# Patient Record
Sex: FEMALE | Race: WHITE | NOT HISPANIC OR LATINO | Employment: FULL TIME
[De-identification: names, ages, dates, MRNs, and addresses within clinical notes are randomized per-mention and may not be internally consistent; named-entity substitution may affect disease eponyms.]

---

## 2020-12-27 ENCOUNTER — HEALTH MAINTENANCE LETTER (OUTPATIENT)
Age: 45
End: 2020-12-27

## 2021-10-09 ENCOUNTER — HEALTH MAINTENANCE LETTER (OUTPATIENT)
Age: 46
End: 2021-10-09

## 2021-12-29 ENCOUNTER — OFFICE VISIT (OUTPATIENT)
Dept: OPTOMETRY | Facility: CLINIC | Age: 46
End: 2021-12-29
Payer: COMMERCIAL

## 2021-12-29 DIAGNOSIS — H52.13 MYOPIA OF BOTH EYES: ICD-10-CM

## 2021-12-29 DIAGNOSIS — Z01.00 EXAMINATION OF EYES AND VISION: Primary | ICD-10-CM

## 2021-12-29 DIAGNOSIS — H52.223 REGULAR ASTIGMATISM OF BOTH EYES: ICD-10-CM

## 2021-12-29 DIAGNOSIS — H10.13 ALLERGIC CONJUNCTIVITIS OF BOTH EYES: ICD-10-CM

## 2021-12-29 PROCEDURE — 92310 CONTACT LENS FITTING OU: CPT | Mod: GA | Performed by: OPTOMETRIST

## 2021-12-29 PROCEDURE — 92004 COMPRE OPH EXAM NEW PT 1/>: CPT | Performed by: OPTOMETRIST

## 2021-12-29 PROCEDURE — 92015 DETERMINE REFRACTIVE STATE: CPT | Performed by: OPTOMETRIST

## 2021-12-29 RX ORDER — MELOXICAM 15 MG/1
15 TABLET ORAL DAILY
COMMUNITY
End: 2023-01-31

## 2021-12-29 RX ORDER — SCOLOPAMINE TRANSDERMAL SYSTEM 1 MG/1
1 PATCH, EXTENDED RELEASE TRANSDERMAL
COMMUNITY
End: 2023-03-27

## 2021-12-29 RX ORDER — FEXOFENADINE HCL 180 MG/1
180 TABLET ORAL DAILY
COMMUNITY

## 2021-12-29 RX ORDER — OLOPATADINE HYDROCHLORIDE 2 MG/ML
1 SOLUTION/ DROPS OPHTHALMIC DAILY
Qty: 2.5 ML | Refills: 11 | Status: SHIPPED | OUTPATIENT
Start: 2021-12-29 | End: 2022-12-29

## 2021-12-29 ASSESSMENT — REFRACTION_WEARINGRX
OS_SPHERE: -4.00
OD_CYLINDER: +0.75
OS_AXIS: 135
OD_AXIS: 080
OS_CYLINDER: +0.75
OD_SPHERE: -4.75

## 2021-12-29 ASSESSMENT — SLIT LAMP EXAM - LIDS
COMMENTS: NORMAL
COMMENTS: NORMAL

## 2021-12-29 ASSESSMENT — REFRACTION_CURRENTRX
OS_SPHERE: -3.50
OS_DIAMETER: 14.2
OD_BASECURVE: 8.6
OD_DIAMETER: 14.2
OS_BASECURVE: 8.6
OD_SPHERE: -4.00

## 2021-12-29 ASSESSMENT — TONOMETRY
OD_IOP_MMHG: 12
OS_IOP_MMHG: 12
IOP_METHOD: APPLANATION

## 2021-12-29 ASSESSMENT — REFRACTION_MANIFEST
OS_AXIS: 135
OS_CYLINDER: +0.50
OD_SPHERE: -4.50
OD_CYLINDER: +0.50
OS_SPHERE: -4.00
OD_AXIS: 070

## 2021-12-29 ASSESSMENT — CUP TO DISC RATIO
OD_RATIO: 0.25
OS_RATIO: 0.2

## 2021-12-29 ASSESSMENT — VISUAL ACUITY
OD_CC: 20/20
OS_CC: 20/20 -2
CORRECTION_TYPE: CONTACTS
OS_CC: 20/20
OD_CC: 20/20
METHOD: SNELLEN - LINEAR
OS_CC+: -1

## 2021-12-29 ASSESSMENT — EXTERNAL EXAM - LEFT EYE: OS_EXAM: NORMAL

## 2021-12-29 ASSESSMENT — EXTERNAL EXAM - RIGHT EYE: OD_EXAM: NORMAL

## 2021-12-29 ASSESSMENT — CONF VISUAL FIELD
OD_NORMAL: 1
OS_NORMAL: 1

## 2021-12-29 NOTE — PROGRESS NOTES
Chief Complaint   Patient presents with     COMPREHENSIVE EYE EXAM     Contact Lens Fitting        Previous contact lens wearer? Yes: B & L Infuse daily  Comfort of contact lenses : good  Satisfied with current lenses: Yes      Last Eye Exam: 12/1/2020  Dilated Previousl/y: Yes, Cristina is very sensitive to dilation. She does not want to be dilated today. She prefers to have it done in the evening and get a     What are you currently using to see?  glasses and contacts    Distance Vision Acuity: Satisfied with vision    Near Vision Acuity: Not satisfied     Eye Comfort: watery and itchy at times. She has allergies  Do you use eye drops? 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       OBJECTIVE: See Ophthalmology exam    ASSESSMENT:    ICD-10-CM    1. Examination of eyes and vision  Z01.00    2. Myopia of both eyes  H52.13    3. Regular astigmatism of both eyes  H52.223    4. Allergic conjunctivitis of both eyes  H10.13 olopatadine (PATADAY) 0.2 % ophthalmic solution      PLAN:     Patient Instructions   Eyeglass prescription given.    Contact lens prescription given and form signed.    Recommend returning for dilated fundus exam. It is a more thorough exam of the retina.    Return in 1 year for a complete eye exam or sooner if needed.    Dhiraj Mcbride, OD

## 2021-12-29 NOTE — PATIENT INSTRUCTIONS
Eyeglass prescription given.    Contact lens prescription given and form signed. OTC readers as needed. +1.25.    Recommend returning for dilated fundus exam. It is a more thorough exam of the retina.    Return in 1 year for a complete eye exam or sooner if needed.    Dhiraj Mcbride OD      Optometry Providers       Clinic Locations                                 Telephone Number   Dr. Elma Raza    [place]   [facility] [phone]     [facility] Hours:                [place] [facility] Hours:       [place] [facility] Hours:   [address]   [address]     [address] [facility] [place] [address]   [place] [address]    [place] [address]  Phone: [phone]                    Phone: [phone]     Phone: [phone]                      Monday 8:00-6:00                          Monday 8:00-6:00                          Monday 8:00-6:00              Tuesday 8:00-6:00                          Tuesday 8:00-6:00                          Tuesday 8:00-6:00              Wednesday 8:00-6:00                  Wednesday 8:00-6:00                   Wednesday 8:00-6:00      Thursday 8:00-6:00                        Thursday 8:00-6:00                         Thursday 8:00-6:00            Friday 8:00-5:00                              Friday 8:00-5:00                              Friday 8:00-5:00    [facility] Hours:   [address] [place] [address]  [phone]    Monday 9:00-6:00  Tuesday 9:00-6:00  Wednesday 9:00-6:00  Thursday 9:00-6:00  Friday 9:00-5:00  Please log on to [email or web address] to order your contact lenses.  The link is found on the [facility] page.  As always, Thank you for trusting us with your health care needs!

## 2022-01-29 ENCOUNTER — HEALTH MAINTENANCE LETTER (OUTPATIENT)
Age: 47
End: 2022-01-29

## 2022-03-03 ENCOUNTER — LAB REQUISITION (OUTPATIENT)
Dept: LAB | Facility: CLINIC | Age: 47
End: 2022-03-03

## 2022-03-03 PROCEDURE — 86481 TB AG RESPONSE T-CELL SUSP: CPT | Performed by: INTERNAL MEDICINE

## 2022-03-03 PROCEDURE — 86706 HEP B SURFACE ANTIBODY: CPT | Performed by: INTERNAL MEDICINE

## 2022-03-04 LAB
HBV SURFACE AB SERPL IA-ACNC: 850.44 M[IU]/ML
QUANTIFERON MITOGEN: 10 IU/ML
QUANTIFERON NIL TUBE: 0.07 IU/ML
QUANTIFERON TB1 TUBE: 0.07 IU/ML
QUANTIFERON TB2 TUBE: 0.07

## 2022-03-05 LAB
GAMMA INTERFERON BACKGROUND BLD IA-ACNC: 0.07 IU/ML
M TB IFN-G BLD-IMP: NEGATIVE
M TB IFN-G CD4+ BCKGRND COR BLD-ACNC: 9.93 IU/ML
MITOGEN IGNF BCKGRD COR BLD-ACNC: 0 IU/ML
MITOGEN IGNF BCKGRD COR BLD-ACNC: 0 IU/ML

## 2022-09-11 ENCOUNTER — HEALTH MAINTENANCE LETTER (OUTPATIENT)
Age: 47
End: 2022-09-11

## 2023-01-31 DIAGNOSIS — M54.9 BACK PAIN, UNSPECIFIED BACK LOCATION, UNSPECIFIED BACK PAIN LATERALITY, UNSPECIFIED CHRONICITY: Primary | ICD-10-CM

## 2023-01-31 RX ORDER — MELOXICAM 15 MG/1
15 TABLET ORAL DAILY PRN
Qty: 30 TABLET | Refills: 3 | Status: SHIPPED | OUTPATIENT
Start: 2023-01-31 | End: 2024-02-19

## 2023-03-23 ENCOUNTER — OFFICE VISIT (OUTPATIENT)
Dept: OPTOMETRY | Facility: CLINIC | Age: 48
End: 2023-03-23
Payer: COMMERCIAL

## 2023-03-23 DIAGNOSIS — H52.223 REGULAR ASTIGMATISM OF BOTH EYES: ICD-10-CM

## 2023-03-23 DIAGNOSIS — H52.13 MYOPIA OF BOTH EYES: ICD-10-CM

## 2023-03-23 DIAGNOSIS — Z01.00 EXAMINATION OF EYES AND VISION: Primary | ICD-10-CM

## 2023-03-23 DIAGNOSIS — H52.4 PRESBYOPIA: ICD-10-CM

## 2023-03-23 PROCEDURE — 92014 COMPRE OPH EXAM EST PT 1/>: CPT | Performed by: OPTOMETRIST

## 2023-03-23 PROCEDURE — 92310 CONTACT LENS FITTING OU: CPT | Mod: GA | Performed by: OPTOMETRIST

## 2023-03-23 PROCEDURE — 92015 DETERMINE REFRACTIVE STATE: CPT | Performed by: OPTOMETRIST

## 2023-03-23 ASSESSMENT — REFRACTION_WEARINGRX
OS_CYLINDER: +0.50
OS_SPHERE: -4.00
OS_SPHERE: -4.00
OS_AXIS: 135
OD_AXIS: 080
OS_AXIS: 135
OD_AXIS: 080
OS_AXIS: 135
OD_CYLINDER: +0.75
OS_SPHERE: -4.00
OS_CYLINDER: +0.75
OD_SPHERE: -4.50
OS_CYLINDER: +0.75
OD_CYLINDER: +0.75
OD_AXIS: 070
OD_CYLINDER: +0.50
SPECS_TYPE: SVL
OD_SPHERE: -4.75
OD_SPHERE: -4.75

## 2023-03-23 ASSESSMENT — SLIT LAMP EXAM - LIDS
COMMENTS: NORMAL
COMMENTS: NORMAL

## 2023-03-23 ASSESSMENT — TONOMETRY
IOP_METHOD: APPLANATION
OS_IOP_MMHG: 14
OD_IOP_MMHG: 14

## 2023-03-23 ASSESSMENT — REFRACTION_MANIFEST
OS_CYLINDER: +0.50
OD_AXIS: 070
OS_AXIS: 135
OD_SPHERE: -4.50
OS_SPHERE: -4.00
OD_CYLINDER: +0.50

## 2023-03-23 ASSESSMENT — CONF VISUAL FIELD
OS_NORMAL: 1
OS_INFERIOR_NASAL_RESTRICTION: 0
OS_INFERIOR_TEMPORAL_RESTRICTION: 0
OD_NORMAL: 1
OD_SUPERIOR_TEMPORAL_RESTRICTION: 0
OD_INFERIOR_NASAL_RESTRICTION: 0
OD_INFERIOR_TEMPORAL_RESTRICTION: 0
OS_SUPERIOR_NASAL_RESTRICTION: 0
OS_SUPERIOR_TEMPORAL_RESTRICTION: 0
OD_SUPERIOR_NASAL_RESTRICTION: 0

## 2023-03-23 ASSESSMENT — VISUAL ACUITY
OS_CC+: -1
OD_CC+: -1
OS_CC: 20/20
CORRECTION_TYPE: CONTACTS
OD_CC: 20/20
OD_CC: 20/20
OS_CC: 20/20
METHOD: SNELLEN - LINEAR

## 2023-03-23 ASSESSMENT — REFRACTION_CURRENTRX
OS_DIAMETER: 14.2
OD_DIAMETER: 14.2
OD_SPHERE: -4.00
OS_BASECURVE: 8.6
OD_BASECURVE: 8.6
OS_SPHERE: -3.50

## 2023-03-23 ASSESSMENT — EXTERNAL EXAM - RIGHT EYE: OD_EXAM: NORMAL

## 2023-03-23 ASSESSMENT — CUP TO DISC RATIO
OS_RATIO: 0.25
OD_RATIO: 0.25

## 2023-03-23 ASSESSMENT — EXTERNAL EXAM - LEFT EYE: OS_EXAM: NORMAL

## 2023-03-23 NOTE — LETTER
3/23/2023         RE: Cristina Sosa  [address]  [facility] [address]        Dear Colleague,    Thank you for referring your patient, Cristina Sosa, to the [facility]. Please see a copy of my visit note below.    Chief Complaint   Patient presents with     Annual Eye Exam     More contacts fit fee $75 ok        Previous contact lens wearer? Yes: b&l infuse   Comfort of contact lenses :good  Satisfied with current lenses: Yes    Last Eye Exam: 12-  Dilated Previously: Yes, last dilation in 2020 but does not want dilatin today     What are you currently using to see?  glasses and contacts    Distance Vision Acuity: Noticed gradual change in both eyes    Near Vision Acuity: Not satisfied ,has to use cheaters over contacts - +1.25/+1.50    Eye Comfort: good- sometimes itchy eyes with allergies- Oral allergy med takes care of it (Allegra)  Do you use eye drops? 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  Probably would not adjust to monovision.    Jazmin Coats Optometric Assistant, A.B.O.C.     Medical, surgical and family histories reviewed and updated 3/23/2023.       OBJECTIVE: See Ophthalmology exam    ASSESSMENT:    ICD-10-CM    1. Examination of eyes and vision  Z01.00       2. Myopia of both eyes  H52.13       3. Regular astigmatism of both eyes  H52.223          PLAN:     Patient Instructions   Eyeglass prescription given.    Recommend returning for dilated fundus exam. It is a more thorough exam of the retina.    Recommend annual eye exams.    Dhiraj Mcbride, OD                           Again, thank you for allowing me to participate in the care of your patient.        Sincerely,        Dhiraj Mcbride, OD

## 2023-03-23 NOTE — PROGRESS NOTES
Chief Complaint   Patient presents with     Annual Eye Exam     More contacts fit fee $75 ok        Previous contact lens wearer? Yes: b&l infuse   Comfort of contact lenses :good  Satisfied with current lenses: Yes    Last Eye Exam: 12-  Dilated Previously: Yes, last dilation in 2020 but does not want dilatin today     What are you currently using to see?  glasses and contacts    Distance Vision Acuity: Noticed gradual change in both eyes    Near Vision Acuity: Not satisfied ,has to use cheaters over contacts - +1.25/+1.50    Eye Comfort: good- sometimes itchy eyes with allergies- Oral allergy med takes care of it (Allegra)  Do you use eye drops? 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  Probably would not adjust to monovision.    Jazmin Coats Optometric Assistant, A.B.O.C.     Medical, surgical and family histories reviewed and updated 3/23/2023.       OBJECTIVE: See Ophthalmology exam    ASSESSMENT:    ICD-10-CM    1. Examination of eyes and vision  Z01.00       2. Myopia of both eyes  H52.13       3. Regular astigmatism of both eyes  H52.223          PLAN:     Patient Instructions   Eyeglass prescription given.    Recommend returning for dilated fundus exam. It is a more thorough exam of the retina.    Recommend annual eye exams.    Dhiraj Mcbride, OD

## 2023-03-23 NOTE — PATIENT INSTRUCTIONS
Eyeglass prescription given.    Recommend returning for dilated fundus exam. It is a more thorough exam of the retina.    Recommend annual eye exams.    Dhiraj Mcbride OD      Optometry Providers       Clinic Locations                                 Telephone Number   Dr. Elma Flores   [place] [phone]     [place] [facility] Hours:                [place] [facility] Hours:       [place] [facility] Hours:   [address]   [address] [facility] [address]     [address] [place] [address]   [place] [address]    [place] [address]  Phone: [phone]                    Phone: [phone]     Phone: [phone]                      Monday 8:00-6:00                          Monday 8:00-6:00                          Monday 8:00-6:00              Tuesday 8:00-6:00                          Tuesday 8:00-6:00                          Tuesday 8:00-6:00              Wednesday 8:00-6:00                  Wednesday 8:00-6:00                   Wednesday 8:00-6:00      Thursday 8:00-6:00                        Thursday 8:00-6:00                         Thursday 8:00-6:00            Friday 8:00-5:00                              Friday 8:00-5:00                              Friday 8:00-5:00    [facility] Hours:   [address] [place] [address]  [phone]    Monday 9:00-6:00  Tuesday 9:00-6:00  Wednesday 9:00-6:00  Thursday 9:00-6:00  Friday 9:00-5:00  Please log on to [email or web address] to order your contact lenses.  The link is found on the [facility] Care [facility] Vision Services page.  As always, Thank you for trusting us with your health care needs!    There is a combination of three treatments which can greatly improve symptoms of dry eyes.     Artificial tears  Heat (eyes closed)  Eyelid and eyelash cleansing (eyes closed)     Use one drop of artificial tears both eyes 4 x daily.  Once in  the morning, lunch, dinner and bedtime. Continue to use the drops regardless if your eyes are comfortable or not.  Artificial tears work best as a preventative and not as well after your eyes are starting to bother you.  It may take 4- 6 weeks of using the drops before you notice improvement.  If after that time you are still having problems schedule an appointment for an evaluation and discussion of different treatments such as Restasis or Xiidra.  Dry eyes are a chronic condition and you may have more symptoms at certain times of the year.    Excess tearing can be due to the right tears not working properly or a blockage in the tear drainage system.  You can try using artificial tears 1 drop both eyes 4 x day.  If the excess tearing is bothersome after 4-6 weeks of treatment then we can send you for further testing.  This would entail a referral to our oculoplastic specialist [phone].    Recommended brands are:    Systane Complete  Systane Ultra  Systane Balance  Refresh Advanced Optive  Refresh Relieva  Blink    Recommended brands for contact lens wearers are:    Systane contacts  Refresh contacts  Blink contacts    If you are using drops more than 4 x day or have sensitivities to preservatives I recommend non preserved artificial tears.  These come in 1 use vials.  They can be used every 1-2 hours.  Do not reuse the vials.    Recommended brands are:    Refresh Optive Bossman-3  Systane- preservative free  Refresh-  preservative free  Blink- preservative free    Gels or ointment can be used at night.    Recommended brands are:    Systane Gel  Refresh Gel  Blink Gel  Genteal Gel    Systane night time (ointment)  Refresh Celluvisc  Refresh PM (ointment)      Visine, Clear Eyes or Murine (drops that get the red out) can irritate the eyes and cause a rebound effect where the eyes become more red and you end up using more drops.  Avoid drops containing tetrahydrozoline,  naphazoline, phenylephrine, oxymetazoline.      OTC Lumify is a newer product that gives immediate redness relief without the rebound effect.  Use as needed to take the redness out.    Artificial tears may be used with other drops (such as allergy, glaucoma, antibiotics) around the same time.  Be sure to wait 5 minutes in between drops.    Heat to the eyelids can also improve your symptoms of dry eyes.  Aditi heat masks can be purchased at Amazon to be used nightly for 10-15 minutes.  Other options are gel masks that can be put in the microwave and purchased at most pharmacies.      Tea Tree Oil eyelid cleansers recommended are Ocusoft Oust foam cleanser to cleanse eyelids/lashes at night and in the am. Other options are Blephadex or Cliradex eyelid wipes.  KEEP EYES CLOSED when using these products.  These can be purchased on amazon.com   A good product for make up remover with tea tree oil is WeLoveEyes.  This can be found at www..[employer] or [employer].    Other good eyelid cleansers have hypochlorous which removes excess bacteria and is safe around the eyes. Products are Avenova, Ocusoft Hypochlor or Heyedrate. Spray solution onto cotton pad, close eyes and gently apply to eyelids and eyelashes using side to side motion.  You can also KEEP EYES CLOSED spray and rub into eyelashes.  You do not need to rinse it off. Use morning and evening. These products can be found on Amazon.  You can check with your local pharmacy and see if they can order if for you if they don't have it.    Other brands of eyelid cleansing wipes are:    Ocusoft wipes  Systane wipes    A great eye make up line is [email or web address]

## 2023-03-27 DIAGNOSIS — Z87.898 H/O MOTION SICKNESS: Primary | ICD-10-CM

## 2023-03-27 RX ORDER — ONDANSETRON 4 MG/1
4 TABLET, ORALLY DISINTEGRATING ORAL EVERY 8 HOURS PRN
Qty: 10 TABLET | Refills: 3 | Status: SHIPPED | OUTPATIENT
Start: 2023-03-27 | End: 2023-07-03

## 2023-03-27 RX ORDER — SCOLOPAMINE TRANSDERMAL SYSTEM 1 MG/1
1 PATCH, EXTENDED RELEASE TRANSDERMAL
Qty: 4 PATCH | Refills: 1 | Status: SHIPPED | OUTPATIENT
Start: 2023-03-27 | End: 2023-07-03

## 2023-05-06 ENCOUNTER — HEALTH MAINTENANCE LETTER (OUTPATIENT)
Age: 48
End: 2023-05-06

## 2023-07-03 ENCOUNTER — TELEPHONE (OUTPATIENT)
Dept: NEUROLOGY | Facility: CLINIC | Age: 48
End: 2023-07-03
Payer: COMMERCIAL

## 2023-07-03 DIAGNOSIS — Z87.898 H/O MOTION SICKNESS: Primary | ICD-10-CM

## 2023-07-03 RX ORDER — SCOLOPAMINE TRANSDERMAL SYSTEM 1 MG/1
1 PATCH, EXTENDED RELEASE TRANSDERMAL
Qty: 4 PATCH | Refills: 1 | Status: SHIPPED | OUTPATIENT
Start: 2023-07-03

## 2023-07-03 RX ORDER — ONDANSETRON 4 MG/1
4 TABLET, ORALLY DISINTEGRATING ORAL EVERY 8 HOURS PRN
Qty: 10 TABLET | Refills: 3 | Status: SHIPPED | OUTPATIENT
Start: 2023-07-03 | End: 2024-03-26

## 2024-02-19 DIAGNOSIS — M54.9 BACK PAIN, UNSPECIFIED BACK LOCATION, UNSPECIFIED BACK PAIN LATERALITY, UNSPECIFIED CHRONICITY: ICD-10-CM

## 2024-02-19 RX ORDER — MELOXICAM 15 MG/1
15 TABLET ORAL DAILY PRN
Qty: 30 TABLET | Refills: 11 | Status: SHIPPED | OUTPATIENT
Start: 2024-02-19

## 2024-03-26 ENCOUNTER — TELEPHONE (OUTPATIENT)
Dept: CARDIOLOGY | Facility: CLINIC | Age: 49
End: 2024-03-26
Payer: COMMERCIAL

## 2024-03-26 DIAGNOSIS — Z87.898 H/O MOTION SICKNESS: Primary | ICD-10-CM

## 2024-03-26 RX ORDER — ONDANSETRON 4 MG/1
4 TABLET, ORALLY DISINTEGRATING ORAL EVERY 8 HOURS PRN
Qty: 10 TABLET | Refills: 3 | Status: SHIPPED | OUTPATIENT
Start: 2024-03-26

## 2024-04-11 ENCOUNTER — OFFICE VISIT (OUTPATIENT)
Dept: OPTOMETRY | Facility: CLINIC | Age: 49
End: 2024-04-11
Payer: COMMERCIAL

## 2024-04-11 DIAGNOSIS — Z01.00 EXAMINATION OF EYES AND VISION: Primary | ICD-10-CM

## 2024-04-11 DIAGNOSIS — H52.223 REGULAR ASTIGMATISM OF BOTH EYES: ICD-10-CM

## 2024-04-11 DIAGNOSIS — H52.13 MYOPIA OF BOTH EYES: ICD-10-CM

## 2024-04-11 DIAGNOSIS — H52.4 PRESBYOPIA: ICD-10-CM

## 2024-04-11 PROCEDURE — 92014 COMPRE OPH EXAM EST PT 1/>: CPT | Performed by: OPTOMETRIST

## 2024-04-11 PROCEDURE — 92310 CONTACT LENS FITTING OU: CPT | Mod: GA | Performed by: OPTOMETRIST

## 2024-04-11 PROCEDURE — 92015 DETERMINE REFRACTIVE STATE: CPT | Performed by: OPTOMETRIST

## 2024-04-11 ASSESSMENT — REFRACTION_CURRENTRX
OS_SPHERE: -2.50
OD_DIAMETER: 14.2
OS_DIAMETER: 14.2
OS_BASECURVE: 8.6
OS_BRAND: ALCON DAILIES TOTAL 1 BC 8.5, D 14.1
OD_BASECURVE: 8.6
OD_SPHERE: -3.00
OD_BRAND: ALCON DAILIES TOTAL 1 BC 8.5, D 14.1
OS_SPHERE: -3.50
OD_SPHERE: -4.00

## 2024-04-11 ASSESSMENT — CONF VISUAL FIELD
METHOD: COUNTING FINGERS
OD_INFERIOR_TEMPORAL_RESTRICTION: 0
OS_INFERIOR_TEMPORAL_RESTRICTION: 0
OD_INFERIOR_NASAL_RESTRICTION: 0
OS_SUPERIOR_TEMPORAL_RESTRICTION: 0
OD_SUPERIOR_NASAL_RESTRICTION: 0
OS_INFERIOR_NASAL_RESTRICTION: 0
OD_NORMAL: 1
OD_SUPERIOR_TEMPORAL_RESTRICTION: 0
OS_NORMAL: 1
OS_SUPERIOR_NASAL_RESTRICTION: 0

## 2024-04-11 ASSESSMENT — TONOMETRY
OD_IOP_MMHG: 15
IOP_METHOD: APPLANATION
OS_IOP_MMHG: 15

## 2024-04-11 ASSESSMENT — VISUAL ACUITY
OS_CC: J1+
CORRECTION_TYPE: CONTACTS
OS_CC: 20/20
OD_CC: J1+
OS_CC+: -1
OD_CC: 20/25
METHOD: SNELLEN - LINEAR

## 2024-04-11 ASSESSMENT — REFRACTION_WEARINGRX
OD_SPHERE: -4.50
OS_SPHERE: -4.00
OS_CYLINDER: +0.50
OD_CYLINDER: +0.50
OD_AXIS: 074
OS_AXIS: 140

## 2024-04-11 ASSESSMENT — REFRACTION_MANIFEST
OS_CYLINDER: +0.50
OD_SPHERE: -4.50
OS_AXIS: 140
OD_CYLINDER: +0.50
OD_AXIS: 074
OS_SPHERE: -4.00

## 2024-04-11 ASSESSMENT — SLIT LAMP EXAM - LIDS
COMMENTS: NORMAL
COMMENTS: NORMAL

## 2024-04-11 ASSESSMENT — EXTERNAL EXAM - LEFT EYE: OS_EXAM: NORMAL

## 2024-04-11 ASSESSMENT — CUP TO DISC RATIO
OD_RATIO: 0.25
OS_RATIO: 0.25

## 2024-04-11 ASSESSMENT — EXTERNAL EXAM - RIGHT EYE: OD_EXAM: NORMAL

## 2024-04-11 NOTE — LETTER
4/11/2024         RE: Cristina Sosa  [address]  [facility] [address]        Dear Colleague,    Thank you for referring your patient, Cristina Sosa, to the [facility]. Please see a copy of my visit note below.    Chief Complaint   Patient presents with     Annual Eye Exam        Previous contact lens wearer? Yes: B&L infuse  Comfort of contact lenses :yes  Satisfied with current lenses: Yes  Last Eye Exam: 1 yr  Dilated Previously: Yes, side effects of dilation explained today    What are you currently using to see?  Readers +1.25 / +1.50    Distance Vision Acuity: Satisfied with vision    Near Vision Acuity: Not satisfied - wearing readers more often when working at computer     Eye Comfort: good  Do you use eye drops? 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 Probably would not adjust to monovision or multifocal.    TIMOTHY Trujillo      Medical, surgical and family histories reviewed and updated 4/11/2024.       OBJECTIVE: See Ophthalmology exam    ASSESSMENT:    ICD-10-CM    1. Examination of eyes and vision  Z01.00 EYE EXAM (SIMPLE-NONBILLABLE)      2. Myopia of both eyes  H52.13 REFRACTION     CONTACT LENS FITTING,BILAT w/ signed waiver      3. Regular astigmatism of both eyes  H52.223 REFRACTION      4. Presbyopia  H52.4 REFRACTION     CONTACT LENS FITTING,BILAT w/ signed waiver         PLAN:     Patient Instructions   Eyeglass prescription given.    Will try contacts both eyes for near vision and glasses for distance.  Will also try Dailies total 1 lenses for comfort. Trials will be ordered for .    Ok to order if satisfied.   You may also wear distance lenses and readers as needed.    Recommend returning for dilated fundus exam. It is a more thorough exam of the retina.    Recommend annual eye exams.    Dhiraj Mcbride, ASHLEY                      Again,  thank you for allowing me to participate in the care of your patient.        Sincerely,        Dhiraj Mcbride OD

## 2024-04-11 NOTE — PROGRESS NOTES
Chief Complaint   Patient presents with    Annual Eye Exam        Previous contact lens wearer? Yes: B&L infuse  Comfort of contact lenses :yes  Satisfied with current lenses: Yes  Last Eye Exam: 1 yr  Dilated Previously: Yes, side effects of dilation explained today    What are you currently using to see?  Readers +1.25 / +1.50    Distance Vision Acuity: Satisfied with vision    Near Vision Acuity: Not satisfied - wearing readers more often when working at computer     Eye Comfort: good  Do you use eye drops? 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 Probably would not adjust to monovision or multifocal.    TIMOTHY Trujillo      Medical, surgical and family histories reviewed and updated 4/11/2024.       OBJECTIVE: See Ophthalmology exam    ASSESSMENT:    ICD-10-CM    1. Examination of eyes and vision  Z01.00 EYE EXAM (SIMPLE-NONBILLABLE)      2. Myopia of both eyes  H52.13 REFRACTION     CONTACT LENS FITTING,BILAT w/ signed waiver      3. Regular astigmatism of both eyes  H52.223 REFRACTION      4. Presbyopia  H52.4 REFRACTION     CONTACT LENS FITTING,BILAT w/ signed waiver         PLAN:     Patient Instructions   Eyeglass prescription given.    Will try contacts both eyes for near vision and glasses for distance.  Will also try Dailies total 1 lenses for comfort. Trials will be ordered for .    Ok to order if satisfied.   You may also wear distance lenses and readers as needed.    Recommend returning for dilated fundus exam. It is a more thorough exam of the retina.    Recommend annual eye exams.    Dhiraj Mcbride, OD

## 2024-04-11 NOTE — PATIENT INSTRUCTIONS
Eyeglass prescription given.    Will try contacts both eyes for near vision and glasses for distance.  Will also try Dailies total 1 lenses for comfort. Trials will be ordered for .    Ok to order if satisfied.   You may also wear distance lenses and readers as needed.    Recommend returning for dilated fundus exam. It is a more thorough exam of the retina.    Recommend annual eye exams.    Dhiraj Mcbride OD      Optometry Providers       Clinic Locations                                 Telephone Number   Dr. Elma Raza    [place]   [facility]  [facility] [phone]     [place] [facility] Hours:                [place] [facility] Hours:       [place] [facility] Hours:   [address]   [address]      [address] [place] [address]   [place] [address]    [place] [address]  Phone: [phone]                    Phone: [phone]     Phone: [phone]                      Monday 8:00-6:00                          Monday 8:00-6:00                          Monday 8:00-6:00              Tuesday 8:00-6:00                          Tuesday 8:00-6:00                          Tuesday 8:00-6:00              Wednesday 8:00-6:00                  Wednesday 8:00-6:00                   Wednesday 8:00-6:00      Thursday 8:00-6:00                        Thursday 8:00-6:00                         Thursday 8:00-6:00            Friday 8:00-5:00                              Friday 8:00-5:00                              Friday 8:00-5:00    [facility] Hours:   [address] [place], [place] [address] [phone]    Monday 9:00-6:00  Tuesday 9:00-6:00  Wednesday 9:00-6:00  Thursday 9:00-6:00  Friday 9:00-5:00  As always, Thank you for trusting us with your health care needs!

## 2024-04-21 ENCOUNTER — TELEPHONE (OUTPATIENT)
Dept: CARDIOLOGY | Facility: CLINIC | Age: 49
End: 2024-04-21
Payer: COMMERCIAL

## 2024-04-21 DIAGNOSIS — M54.9 BACK PAIN, UNSPECIFIED BACK LOCATION, UNSPECIFIED BACK PAIN LATERALITY, UNSPECIFIED CHRONICITY: Primary | ICD-10-CM

## 2024-04-21 RX ORDER — PREDNISONE 20 MG/1
20 TABLET ORAL DAILY
Qty: 7 TABLET | Refills: 0 | Status: SHIPPED | OUTPATIENT
Start: 2024-04-21 | End: 2024-04-28

## 2024-05-13 ENCOUNTER — MEDICAL CORRESPONDENCE (OUTPATIENT)
Dept: HEALTH INFORMATION MANAGEMENT | Facility: CLINIC | Age: 49
End: 2024-05-13
Payer: COMMERCIAL

## 2024-05-13 ENCOUNTER — TELEPHONE (OUTPATIENT)
Dept: ENDOCRINOLOGY | Facility: CLINIC | Age: 49
End: 2024-05-13
Payer: COMMERCIAL

## 2024-05-13 NOTE — TELEPHONE ENCOUNTER
No referral in place.     Endo team notified to please contact referring provider and inform Dr Jaime is not accepting new patients and they can submit a referral to the Endocrine Specialty clinic if sooner visit than first available needed. We cannot use this referral. A new referral is needed.     CCs notified to schedule first available with any provider who manages adrenal.    Thank you   Jillian Boone RN on 5/13/2024 at 9:53 AM   RE  Diagnoses   History of partial adrenalectomy (HCC)   Hypotension, unspecified hypotension type

## 2024-05-13 NOTE — TELEPHONE ENCOUNTER
Called Referring MD to get new referral for unspecified MD as Dr. Jaime is not seeing new pts at this time. LVM with nurse to have new one faxed to us.

## 2024-05-14 ENCOUNTER — TRANSCRIBE ORDERS (OUTPATIENT)
Dept: OTHER | Age: 49
End: 2024-05-14

## 2024-05-14 DIAGNOSIS — E89.6 HISTORY OF PARTIAL ADRENALECTOMY (H): Primary | ICD-10-CM

## 2024-05-14 DIAGNOSIS — R55 SYNCOPE, UNSPECIFIED SYNCOPE TYPE: ICD-10-CM

## 2024-07-13 ENCOUNTER — TELEPHONE (OUTPATIENT)
Dept: CARDIOLOGY | Facility: CLINIC | Age: 49
End: 2024-07-13
Payer: COMMERCIAL

## 2024-07-13 DIAGNOSIS — T14.8XXA LOCAL INFECTION OF WOUND: Primary | ICD-10-CM

## 2024-07-13 DIAGNOSIS — L08.9 LOCAL INFECTION OF WOUND: Primary | ICD-10-CM

## 2024-07-15 RX ORDER — MUPIROCIN CALCIUM 20 MG/G
CREAM TOPICAL 3 TIMES DAILY
Qty: 30 G | Refills: 0 | Status: SHIPPED | OUTPATIENT
Start: 2024-07-15

## 2024-09-21 ENCOUNTER — HEALTH MAINTENANCE LETTER (OUTPATIENT)
Age: 49
End: 2024-09-21

## 2024-12-02 ENCOUNTER — VIRTUAL VISIT (OUTPATIENT)
Dept: ENDOCRINOLOGY | Facility: CLINIC | Age: 49
End: 2024-12-02
Payer: COMMERCIAL

## 2024-12-02 DIAGNOSIS — R55 SYNCOPE, UNSPECIFIED SYNCOPE TYPE: ICD-10-CM

## 2024-12-02 DIAGNOSIS — E89.6 HISTORY OF PARTIAL ADRENALECTOMY (H): ICD-10-CM

## 2024-12-02 PROBLEM — M25.551 CHRONIC RIGHT HIP PAIN: Status: ACTIVE | Noted: 2021-04-22

## 2024-12-02 PROBLEM — G89.29 CHRONIC RIGHT HIP PAIN: Status: ACTIVE | Noted: 2021-04-22

## 2024-12-02 PROBLEM — Z97.5 IUD (INTRAUTERINE DEVICE) IN PLACE: Status: ACTIVE | Noted: 2019-07-02

## 2024-12-02 PROCEDURE — G2211 COMPLEX E/M VISIT ADD ON: HCPCS | Mod: 95 | Performed by: STUDENT IN AN ORGANIZED HEALTH CARE EDUCATION/TRAINING PROGRAM

## 2024-12-02 PROCEDURE — 99204 OFFICE O/P NEW MOD 45 MIN: CPT | Mod: 95 | Performed by: STUDENT IN AN ORGANIZED HEALTH CARE EDUCATION/TRAINING PROGRAM

## 2024-12-02 ASSESSMENT — PAIN SCALES - GENERAL: PAINLEVEL_OUTOF10: NO PAIN (0)

## 2024-12-02 NOTE — NURSING NOTE
Current patient location: [address] [place] [id number]    Is the patient currently in the state of [place]? YES    Visit mode:VIDEO    If the visit is dropped, the patient can be reconnected by:VIDEO VISIT: Text to cell phone:   Telephone Information:   Mobile [phone]       Will anyone else be joining the visit? NO  (If patient encounters technical issues they should call [phone]    Are changes needed to the allergy or medication list? No    Are refills needed on medications prescribed by this physician? No    Rooming Documentation:  Not applicable    Reason for visit: Consult (NEW ENDOCRINE/)    Jessica TABARES

## 2024-12-02 NOTE — PROGRESS NOTES
Endocrinology Clinic Visit 12/2/2024      Video-Visit Details    Type of service:  Video Visit  Joined the call at 12/2/2024, 2:13:52 pm.  Left the call at 12/2/2024, 2:45:43 pm.    Originating Location (pt. Location): Home        Distant Location (provider location):  Off-site    Mode of Communication:  Video Conference via [employer])    Physician has received verbal consent for a Video Visit from the patient? Yes    I spent a total of 45 minutes on the date of encounter reviewing medical records, evaluating the patient, coordinating care and documenting in the EHR, as detailed above.      NAME:  Cristina Sosa  PCP:  No Ref-Primary, Physician  MRN:  [id number]  Reason for Consult:  s/p left adrenalectomy, pre-syncope  Requesting Provider:  Flori Johnson    Chief Complaint     Chief Complaint   Patient presents with    Consult     NEW ENDOCRINE         History of Present Illness     Cristina Sosa is a 49 year old female who is seen in video visit for pre-syncope and concern for AI.    She has a PMH significant for secondary HTN due to primary hyperaldosteronism s/p left adrenalectomy at [facility] more than 10 years ago.  10 years ago abdomeinal pain, low K and BP was high.  CT A/P was unremarkable.    She was seen by endocrine Dr. Heart at [facility] in 2014 after an episode of abdominal pain, low K and high BP. She had an unremarkable CT A/P but work up showed primary hyperaldosteronism. She said she had AVS at [place]. ? Bilateral adrenal hyperplasia. But also reported left lateralization. Had Left adrenal gland removal.    She has been healthy since then. No HTN. Not on any chronic meds    Over a year ago, she started feeling unwell.  Wake up with profuse sweating , nausea, dizziness and feeling that she is going to faint. It happened 3 times in the past year. In between episodes she has no sx, she feels very healthy. Last episodes was 10/2024 and had an ED visit. She had BMP done remarkable for slight  elevation in Cr; she reported she was dehydrated that day.    She was prescribed prednisone for SI pain  for 7 days, she felt much better on it. She has multiple other joint pain which flares after her pre-syncope episodes.       She has mirena IUD.     She had labs done by her PCP 5/2024 morning cortisol 15 and normal TFT.      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 She does not smoke. She does not drink alcohol. No illicit drugs or recreational drugs.    Problem List     Patient Active Problem List   Diagnosis    Functional dyspepsia    Allergic rhinitis    Chronic right hip pain    IUD (intrauterine device) in place    Nausea    Primary aldosteronism (H)    Sinusitis    Urticaria    Vitamin D deficiency        Medications     Current Outpatient Medications   Medication Sig Dispense Refill    fexofenadine (ALLEGRA) 180 MG tablet Take 180 mg by mouth daily      meloxicam (MOBIC) 15 MG tablet Take 1 tablet (15 mg) by mouth daily as needed for moderate pain 30 tablet 11    mupirocin (BACTROBAN) 2 % external cream Apply topically 3 times daily 30 g 0    ondansetron (ZOFRAN ODT) 4 MG ODT tab Take 1 tablet (4 mg) by mouth every 8 hours as needed for nausea or vomiting 10 tablet 3    scopolamine (TRANSDERM) 1 MG/3DAYS 72 hr patch Place 1 patch onto the skin every 72 hours 4 patch 1     No current facility-administered medications for this visit.        Allergies     Allergies   Allergen Reactions    Cephalosporins Hives    Penicillins Hives    Sulfa Antibiotics Hives       Medical / Surgical History     No past medical history on file.  No past surgical history on file.    Social History     Social History     Socioeconomic History    Marital status: Single     Spouse name: Not on file    Number of children: Not on file    Years of education: Not on file    Highest education level: Not on file   Occupational History    Not on file   Tobacco Use    Smoking status: Never    Smokeless tobacco: Never  "  Substance and Sexual Activity    Alcohol use: Not on file    Drug use: Not on file    Sexual activity: Not on file   Other Topics Concern    Not on file   Social History Narrative    Not on file     Social Drivers of Health     Financial Resource Strain: Not on file   Food Insecurity: Not on file   Transportation Needs: Not on file   Physical Activity: Not on file   Stress: Not on file   Social Connections: Not on file   Interpersonal Safety: Not At Risk (10/16/2024)    Received from [facility]     Humiliation, Afraid, Rape, and Kick questionnaire     Fear of Current or Ex-Partner: No     Emotionally Abused: No     Physically Abused: No     Sexually Abused: No   Housing Stability: Not on file       Family History     Family History   Problem Relation Age of Onset    Cerebrovascular Disease Mother     Hypertension Mother     Hypertension Father     Cancer Father     Glaucoma Maternal Grandmother     Macular Degeneration Maternal Grandmother        ROS     12 ROS completed, pertinent positive and negative in HPI    Physical Exam   There were no vitals taken for this visit.   GENERAL: alert and no distress  EYES: Eyes grossly normal to inspection.  No discharge or erythema, or obvious scleral/conjunctival abnormalities.  RESP: No audible wheeze, cough, or visible cyanosis.    SKIN: Visible skin clear. No significant rash, abnormal pigmentation or lesions.  NEURO: Cranial nerves grossly intact.  Mentation and speech appropriate for age.  PSYCH: Appropriate affect, tone, and pace of words     Labs/Imaging     Pertinent Labs were reviewed and updated in EPIC and discussed briefly.  Radiology Results were  reviewed and updated in EPIC and discussed briefly.    Summary of recent findings:   No results found for: \"A1C\"    No results found for: \"TSH\", \"T4\"    No results found for: \"CR\"    No results for input(s): \"CHOL\", \"HDL\", \"LDL\", \"TRIG\", \"CHOLHDLRATIO\" in the last 80022 hours.    No results found for: " "\"[id number]\", \"[id number]\", \"[id number]\"    I personally reviewed the patient's outside records from [facility] EMR and Care Everywhere. Summary of pertinent findings in HPI.    Impression / Plan     1. History of primary hyperaldosteronism s/p left adrenalectomy  2.  Episodic presyncope  3.  Concern for adrenal insufficiency  We discussed today that her episodic presyncope is unlikely to be due to adrenal insufficiency given normal cortisol level completed and outside lab May 2024.  Given recent episode since after her labs done we will plan on rechecking her morning cortisol.  If cortisol above 10 this rules out adrenal insufficiency.    4.  Elevated creatinine  Noted on labs from her ED visit back in October.  Previous creatinine from 2019 and 2018 range around 0.9 -1.  Advised her to establish with PCP for ongoing management and follow-up.            Test and/or medications prescribed today:  Orders Placed This Encounter   Procedures    Cortisol    Basic metabolic panel         Follow up: pending labs    The longitudinal plan of care for the diagnosis(es)/condition(s) as documented were addressed during this visit. Due to the added complexity in care, I will continue to support Cristina in the subsequent management and with ongoing continuity of care.        Jess Rodrigues MD  Endocrinology, Diabetes and Metabolism  [facility]    "

## 2024-12-02 NOTE — LETTER
12/2/2024      Cristina Sosa  [address]  [place] [address]      Dear Colleague,    Thank you for referring your patient, Cristina Sosa, to the [facility]. Please see a copy of my visit note below.    Endocrinology Clinic Visit 12/2/2024      Video-Visit Details    Type of service:  Video Visit  Joined the call at 12/2/2024, 2:13:52 pm.  Left the call at 12/2/2024, 2:45:43 pm.    Originating Location (pt. Location): Home        Distant Location (provider location):  Off-site    Mode of Communication:  Video Conference via [email or web address]    Physician has received verbal consent for a Video Visit from the patient? Yes    I spent a total of 45 minutes on the date of encounter reviewing medical records, evaluating the patient, coordinating care and documenting in the EHR, as detailed above.      NAME:  Cristina Sosa  PCP:  No Ref-Primary, Physician  MRN:  [id number]  Reason for Consult:  s/p left adrenalectomy, pre-syncope  Requesting Provider:  Flori Johnson    Chief Complaint     Chief Complaint   Patient presents with     Consult     NEW ENDOCRINE         History of Present Illness     Cristina Sosa is a 49 year old female who is seen in video visit for pre-syncope and concern for AI.    She has a PMH significant for secondary HTN due to primary hyperaldosteronism s/p left adrenalectomy at [facility] more than 10 years ago.  10 years ago abdomeinal pain, low K and BP was high.  CT A/P was unremarkable.    She was seen by endocrine Dr. Heart at [facility] in 2014 after an episode of abdominal pain, low K and high BP. She had an unremarkable CT A/P but work up showed primary hyperaldosteronism. She said she had AVS at [place]. ? Bilateral adrenal hyperplasia. But also reported left lateralization. Had Left adrenal gland removal.    She has been healthy since then. No HTN. Not on any chronic meds    Over a year ago, she started feeling unwell.  Wake up with profuse sweating , nausea,  dizziness and feeling that she is going to faint. It happened 3 times in the past year. In between episodes she has no sx, she feels very healthy. Last episodes was 10/2024 and had an ED visit. She had BMP done remarkable for slight elevation in Cr; she reported she was dehydrated that day.    She was prescribed prednisone for SI pain  for 7 days, she felt much better on it. She has multiple other joint pain which flares after her pre-syncope episodes.       She has mirena IUD.     She had labs done by her PCP 5/2024 morning cortisol 15 and normal TFT.      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 She does not smoke. She does not drink alcohol. No illicit drugs or recreational drugs.    Problem List     Patient Active Problem List   Diagnosis     Functional dyspepsia     Allergic rhinitis     Chronic right hip pain     IUD (intrauterine device) in place     Nausea     Primary aldosteronism (H)     Sinusitis     Urticaria     Vitamin D deficiency        Medications     Current Outpatient Medications   Medication Sig Dispense Refill     fexofenadine (ALLEGRA) 180 MG tablet Take 180 mg by mouth daily       meloxicam (MOBIC) 15 MG tablet Take 1 tablet (15 mg) by mouth daily as needed for moderate pain 30 tablet 11     mupirocin (BACTROBAN) 2 % external cream Apply topically 3 times daily 30 g 0     ondansetron (ZOFRAN ODT) 4 MG ODT tab Take 1 tablet (4 mg) by mouth every 8 hours as needed for nausea or vomiting 10 tablet 3     scopolamine (TRANSDERM) 1 MG/3DAYS 72 hr patch Place 1 patch onto the skin every 72 hours 4 patch 1     No current facility-administered medications for this visit.        Allergies     Allergies   Allergen Reactions     Cephalosporins Hives     Penicillins Hives     Sulfa Antibiotics Hives       Medical / Surgical History     No past medical history on file.  No past surgical history on file.    Social History     Social History     Socioeconomic History     Marital status: Single      Spouse name: Not on file     Number of children: Not on file     Years of education: Not on file     Highest education level: Not on file   Occupational History     Not on file   Tobacco Use     Smoking status: Never     Smokeless tobacco: Never   Substance and Sexual Activity     Alcohol use: Not on file     Drug use: Not on file     Sexual activity: Not on file   Other Topics Concern     Not on file   Social History Narrative     Not on file     Social Drivers of Health     Financial Resource Strain: Not on file   Food Insecurity: Not on file   Transportation Needs: Not on file   Physical Activity: Not on file   Stress: Not on file   Social Connections: Not on file   Interpersonal Safety: Not At Risk (10/16/2024)    Received from [facility]     Humiliation, Afraid, Rape, and Kick questionnaire      Fear of Current or Ex-Partner: No      Emotionally Abused: No      Physically Abused: No      Sexually Abused: No   Housing Stability: Not on file       Family History     Family History   Problem Relation Age of Onset     Cerebrovascular Disease Mother      Hypertension Mother      Hypertension Father      Cancer Father      Glaucoma Maternal Grandmother      Macular Degeneration Maternal Grandmother        ROS     12 ROS completed, pertinent positive and negative in HPI    Physical Exam   There were no vitals taken for this visit.   GENERAL: alert and no distress  EYES: Eyes grossly normal to inspection.  No discharge or erythema, or obvious scleral/conjunctival abnormalities.  RESP: No audible wheeze, cough, or visible cyanosis.    SKIN: Visible skin clear. No significant rash, abnormal pigmentation or lesions.  NEURO: Cranial nerves grossly intact.  Mentation and speech appropriate for age.  PSYCH: Appropriate affect, tone, and pace of words     Labs/Imaging     Pertinent Labs were reviewed and updated in EPIC and discussed briefly.  Radiology Results were  reviewed and updated in EPIC and discussed  "briefly.    Summary of recent findings:   No results found for: \"A1C\"    No results found for: \"TSH\", \"T4\"    No results found for: \"CR\"    No results for input(s): \"CHOL\", \"HDL\", \"LDL\", \"TRIG\", \"CHOLHDLRATIO\" in the last 50251 hours.    No results found for: \"[id number]\", \"[id number]\", \"[id number]\"    I personally reviewed the patient's outside records from [facility] [facility] EMR and Care Everywhere. Summary of pertinent findings in HPI.    Impression / Plan     1. History of primary hyperaldosteronism s/p left adrenalectomy  2.  Episodic presyncope  3.  Concern for adrenal insufficiency  We discussed today that her episodic presyncope is unlikely to be due to adrenal insufficiency given normal cortisol level completed and outside lab May 2024.  Given recent episode since after her labs done we will plan on rechecking her morning cortisol.  If cortisol above 10 this rules out adrenal insufficiency.    4.  Elevated creatinine  Noted on labs from her ED visit back in October.  Previous creatinine from 2019 and 2018 range around 0.9 -1.  Advised her to establish with PCP for ongoing management and follow-up.            Test and/or medications prescribed today:  Orders Placed This Encounter   Procedures     Cortisol     Basic metabolic panel         Follow up: pending labs    The longitudinal plan of care for the diagnosis(es)/condition(s) as documented were addressed during this visit. Due to the added complexity in care, I will continue to support Cristina in the subsequent management and with ongoing continuity of care.        Jess Rodrigues MD  Endocrinology, Diabetes and Metabolism  [facility]      Again, thank you for allowing me to participate in the care of your patient.        Sincerely,      Jess Rodrigues MD  "